# Patient Record
Sex: MALE | Race: WHITE | NOT HISPANIC OR LATINO | Employment: FULL TIME | URBAN - METROPOLITAN AREA
[De-identification: names, ages, dates, MRNs, and addresses within clinical notes are randomized per-mention and may not be internally consistent; named-entity substitution may affect disease eponyms.]

---

## 2017-10-26 ENCOUNTER — OFFICE VISIT (OUTPATIENT)
Dept: URGENT CARE | Facility: CLINIC | Age: 22
End: 2017-10-26
Payer: COMMERCIAL

## 2017-10-26 VITALS
HEIGHT: 65 IN | DIASTOLIC BLOOD PRESSURE: 84 MMHG | OXYGEN SATURATION: 97 % | TEMPERATURE: 98.6 F | HEART RATE: 78 BPM | RESPIRATION RATE: 16 BRPM | WEIGHT: 135 LBS | BODY MASS INDEX: 22.49 KG/M2 | SYSTOLIC BLOOD PRESSURE: 120 MMHG

## 2017-10-26 DIAGNOSIS — M94.0 COSTOCHONDRITIS: ICD-10-CM

## 2017-10-26 DIAGNOSIS — Z23 NEEDS FLU SHOT: ICD-10-CM

## 2017-10-26 PROCEDURE — 90471 IMMUNIZATION ADMIN: CPT | Performed by: FAMILY MEDICINE

## 2017-10-26 PROCEDURE — 90686 IIV4 VACC NO PRSV 0.5 ML IM: CPT | Performed by: FAMILY MEDICINE

## 2017-10-26 PROCEDURE — 99203 OFFICE O/P NEW LOW 30 MIN: CPT | Mod: 25 | Performed by: FAMILY MEDICINE

## 2017-10-27 ASSESSMENT — ENCOUNTER SYMPTOMS
EXERTIONAL CHEST PRESSURE: 0
VOMITING: 0
NEAR-SYNCOPE: 0
NAUSEA: 0
DIZZINESS: 0

## 2017-10-27 NOTE — PROGRESS NOTES
"Subjective:     Ronald Tang a 22 y.o. male who presents for Chest Pain (x 5-6 days, dull mid chest pain, pain is off / on)       Chest Pain    This is a new problem. The current episode started in the past 7 days. The onset quality is undetermined. The problem occurs intermittently. The problem has been waxing and waning. Pertinent negatives include no dizziness, exertional chest pressure, nausea, near-syncope or vomiting.     Review of Systems   Cardiovascular: Positive for chest pain. Negative for near-syncope.   Gastrointestinal: Negative for nausea and vomiting.   Neurological: Negative for dizziness.     No Known Allergies   Objective:   /84   Pulse 78   Temp 37 °C (98.6 °F)   Resp 16   Ht 1.651 m (5' 5\")   Wt 61.2 kg (135 lb)   SpO2 97%   BMI 22.47 kg/m²   Physical Exam   Constitutional: He is oriented to person, place, and time. He appears well-developed and well-nourished. No distress.   HENT:   Head: Normocephalic and atraumatic.   Eyes: Conjunctivae and EOM are normal. Pupils are equal, round, and reactive to light.   Cardiovascular: Normal rate and regular rhythm.    No murmur heard.  Pulmonary/Chest: Effort normal and breath sounds normal. No respiratory distress. He exhibits tenderness.   Abdominal: Soft. He exhibits no distension. There is no tenderness.   Neurological: He is alert and oriented to person, place, and time. He has normal reflexes. No sensory deficit.   Skin: Skin is warm and dry.   Psychiatric: He has a normal mood and affect.        Assessment/Plan:   Assessment    1. Costochondritis  Use over-the-counter pain reliever, such as acetaminophen (Tylenol), ibuprofen (Advil, Motrin) or naproxen (Aleve) as needed; follow package directions for dosing.     2. Needs flu shot  - Flu Quad Inj >3 Year Pre-Filled PF       "

## 2017-11-02 ENCOUNTER — OFFICE VISIT (OUTPATIENT)
Dept: MEDICAL GROUP | Facility: PHYSICIAN GROUP | Age: 22
End: 2017-11-02
Payer: COMMERCIAL

## 2017-11-02 ENCOUNTER — HOSPITAL ENCOUNTER (OUTPATIENT)
Facility: MEDICAL CENTER | Age: 22
End: 2017-11-02
Attending: NURSE PRACTITIONER
Payer: COMMERCIAL

## 2017-11-02 VITALS
DIASTOLIC BLOOD PRESSURE: 70 MMHG | BODY MASS INDEX: 22.33 KG/M2 | OXYGEN SATURATION: 96 % | HEIGHT: 65 IN | SYSTOLIC BLOOD PRESSURE: 112 MMHG | RESPIRATION RATE: 16 BRPM | HEART RATE: 64 BPM | WEIGHT: 134 LBS | TEMPERATURE: 98.5 F

## 2017-11-02 DIAGNOSIS — L74.519 EXCESSIVE SWEATING, LOCAL: ICD-10-CM

## 2017-11-02 DIAGNOSIS — F41.9 ANXIETY: ICD-10-CM

## 2017-11-02 DIAGNOSIS — M94.0 COSTOCHONDRITIS: ICD-10-CM

## 2017-11-02 DIAGNOSIS — Z11.3 SCREENING FOR STD (SEXUALLY TRANSMITTED DISEASE): ICD-10-CM

## 2017-11-02 PROBLEM — R07.9 CHEST PAIN: Status: ACTIVE | Noted: 2017-11-02

## 2017-11-02 PROCEDURE — 87491 CHLMYD TRACH DNA AMP PROBE: CPT

## 2017-11-02 PROCEDURE — 87591 N.GONORRHOEAE DNA AMP PROB: CPT

## 2017-11-02 PROCEDURE — 99000 SPECIMEN HANDLING OFFICE-LAB: CPT | Performed by: NURSE PRACTITIONER

## 2017-11-02 PROCEDURE — 99214 OFFICE O/P EST MOD 30 MIN: CPT | Performed by: NURSE PRACTITIONER

## 2017-11-02 ASSESSMENT — PAIN SCALES - GENERAL: PAINLEVEL: NO PAIN

## 2017-11-02 ASSESSMENT — PATIENT HEALTH QUESTIONNAIRE - PHQ9: CLINICAL INTERPRETATION OF PHQ2 SCORE: 0

## 2017-11-02 NOTE — ASSESSMENT & PLAN NOTE
This is a new problem. Patient states that since he switched to a different kind of marijuana he has been having increasing anxiety, paranoia states that he notices this most commonly right after he smokes. Patient states that he does notice some increased anxiety during work, repetitive thoughts, states that he has not been having any difficulty with sleep. Patient is not interested in medication physician at this time discussed with patient that I would recommend stopping marijuana and see if that improves his symptoms.

## 2017-11-02 NOTE — PROGRESS NOTES
"Chief Complaint   Patient presents with   • Establish Care     New Patient        HISTORY OF THE PRESENT ILLNESS: This is a 22 y.o. male new patient to our practice. This pleasant patient is here todayTo discuss chest pain, sweating, establish care.    Costochondritis  This is a new problem. States he has been doing a lot of jumping rope lately. States That the pain is intermittent and aching pain. Denies pressure, sweating, jaw pain, numbness in his arm. States he is also noticing some pain on the scars on his abdomen. Patient does report tenderness in his chest wall.    Excessive sweating, local  Chronic in nature. Stable. Patient states over the last year he has noticed excessive sweating on his bilateral palms. Patient states that this is \"sometimes\" related to anxiety but states that he does notice that intermittently at random times. States that he has had increased stress at work but is requesting referral to dermatology.    Anxiety  This is a new problem. Patient states that since he switched to a different kind of marijuana he has been having increasing anxiety, paranoia states that he notices this most commonly right after he smokes. Patient states that he does notice some increased anxiety during work, repetitive thoughts, states that he has not been having any difficulty with sleep. Patient is not interested in medication physician at this time discussed with patient that I would recommend stopping marijuana and see if that improves his symptoms.      Past Medical History:   Diagnosis Date   • Asthma        Past Surgical History:   Procedure Laterality Date   • APPENDECTOMY         Family Status   Relation Status   • Mother Alive   • Father Alive   • Maternal Grandmother Alive   • Maternal Grandfather Alive   • Paternal Grandmother Alive   • Paternal Grandfather      Family History   Problem Relation Age of Onset   • Cancer Mother      breast   • Hyperlipidemia Father    • Hypertension Father    • No " "Known Problems Maternal Grandmother    • Dementia Maternal Grandfather    • Heart Disease Paternal Grandmother      triple bypass   • Stroke Paternal Grandfather        Social History   Substance Use Topics   • Smoking status: Never Smoker   • Smokeless tobacco: Never Used   • Alcohol use 3.6 oz/week     6 Cans of beer per week       Allergies: Review of patient's allergies indicates no known allergies.    No current [facility]-ordered outpatient prescriptions on file.     No current [facility]-ordered facility-administered medications on file.        Review of Systems   Constitutional:  Negative for fever, chills, weight loss and malaise/fatigue.   HENT:  Negative for ear pain, nosebleeds, congestion, sore throat and neck pain.    Eyes:  Negative for blurred vision.   Respiratory:  Negative for cough, sputum production, shortness of breath and wheezing.    Cardiovascular:  Negative for chest pain, palpitations, orthopnea and leg swelling.   Gastrointestinal:  Negative for heartburn, nausea, vomiting and abdominal pain.   Genitourinary:  Negative for dysuria, urgency and frequency.   Musculoskeletal:  Negative for myalgias, back pain and joint pain.   Skin:  Negative for rash and itching.   Neurological:  Negative for dizziness, tingling, tremors, sensory change, focal weakness and headaches.   Endo/Heme/Allergies:  Does not bruise/bleed easily.   Psychiatric/Behavioral:  Negative for depression, anxiety, or memory loss.     All other systems reviewed and are negative except as in HPI.    Exam: Blood pressure 112/70, pulse 64, temperature 36.9 °C (98.5 °F), resp. rate 16, height 1.651 m (5' 5\"), weight 60.8 kg (134 lb), SpO2 96 %.  General:  Normal appearing. No distress.  HEENT:  Normocephalic. Eyes conjunctiva clear lids without ptosis, pupils equal and reactive to light accommodation, ears normal shape and contour, canals are clear bilaterally, tympanic membranes are benign, nasal mucosa benign, oropharynx is without " erythema, edema or exudates.   Neck:  Supple without JVD or bruit. Thyroid is not enlarged.  Pulmonary:  Clear to ausculation.  Normal effort. No rales, ronchi, or wheezing.  Cardiovascular:  Regular rate and rhythm without murmur. Carotid and radial pulses are intact and equal bilaterally.  Abdomen:  Soft, nontender, nondistended. Normal bowel sounds. Liver and spleen are not palpable  Neurologic:  Grossly nonfocal  Lymph:  No cervical, supraclavicular or axillary lymph nodes are palpable  Skin:  Warm and dry.  No obvious lesions.  Musculoskeletal:  Normal gait. No extremity cyanosis, clubbing, or edema.  Psych:  Normal mood and affect. Alert and oriented x3. Judgment and insight is normal.    PLAN:    1. Screening for STD (sexually transmitted disease)  - CHLAMYDIA/GC, DNA PROBE W/RFLX    2. Costochondritis  Discussed with patient that this is likely related to costochondritis, discussed that if symptoms do not improve we will consider an EKG. Discussed with patient's severe symptoms that would require a ER follow-up.    3. Excessive sweating, local  - REFERRAL TO DERMATOLOGY    Follow-up in one year or sooner as needed. Follow-up for worsening anxiety. Patient is encouraged to be seen in the emergency room for chest pain, palpitations, shortness of breath, dizziness, severe abdominal pain or other concerning symptoms.    Please note that this dictation was created using voice recognition software. I have made every reasonable attempt to correct obvious errors, but I expect that there are errors of grammar and possibly content that I did not discover before finalizing the note.      Assessment/Plan  1. Screening for STD (sexually transmitted disease)  CHLAMYDIA/GC, DNA PROBE W/RFLX   2. Costochondritis     3. Excessive sweating, local  REFERRAL TO DERMATOLOGY   4. Anxiety           I have placed the below orders and discussed them with an approved delegating provider. The MA is performing the below orders under  the direction of Dr. Soler.

## 2017-11-02 NOTE — ASSESSMENT & PLAN NOTE
This is a new problem. States he has been doing a lot of jumping rope lately. States That the pain is intermittent and aching pain. Denies pressure, sweating, jaw pain, numbness in his arm. States he is also noticing some pain on the scars on his abdomen. Patient does report tenderness in his chest wall.

## 2017-11-02 NOTE — ASSESSMENT & PLAN NOTE
"Chronic in nature. Stable. Patient states over the last year he has noticed excessive sweating on his bilateral palms. Patient states that this is \"sometimes\" related to anxiety but states that he does notice that intermittently at random times. States that he has had increased stress at work but is requesting referral to dermatology.  "

## 2017-11-03 LAB
C TRACH DNA SPEC QL NAA+PROBE: NEGATIVE
N GONORRHOEA DNA SPEC QL NAA+PROBE: NEGATIVE
SPECIMEN SOURCE: NORMAL

## 2017-11-06 ENCOUNTER — TELEPHONE (OUTPATIENT)
Dept: MEDICAL GROUP | Facility: PHYSICIAN GROUP | Age: 22
End: 2017-11-06

## 2017-11-06 NOTE — TELEPHONE ENCOUNTER
----- Message from EDWIN Quinteros sent at 11/6/2017  8:56 AM PST -----  Please call pt and give lab results: negative for chlamydia and gonorrhea.

## 2018-04-06 ENCOUNTER — OFFICE VISIT (OUTPATIENT)
Dept: MEDICAL GROUP | Facility: PHYSICIAN GROUP | Age: 23
End: 2018-04-06
Payer: COMMERCIAL

## 2018-04-06 VITALS
HEIGHT: 65 IN | SYSTOLIC BLOOD PRESSURE: 128 MMHG | TEMPERATURE: 98.4 F | HEART RATE: 64 BPM | WEIGHT: 139 LBS | BODY MASS INDEX: 23.16 KG/M2 | OXYGEN SATURATION: 98 % | DIASTOLIC BLOOD PRESSURE: 84 MMHG | RESPIRATION RATE: 14 BRPM

## 2018-04-06 DIAGNOSIS — M94.0 COSTOCHONDRITIS: ICD-10-CM

## 2018-04-06 DIAGNOSIS — R07.9 CHEST PAIN, UNSPECIFIED TYPE: ICD-10-CM

## 2018-04-06 DIAGNOSIS — H61.23 BILATERAL HEARING LOSS DUE TO CERUMEN IMPACTION: ICD-10-CM

## 2018-04-06 DIAGNOSIS — F41.9 ANXIETY: ICD-10-CM

## 2018-04-06 DIAGNOSIS — H81.10 BPPV (BENIGN PAROXYSMAL POSITIONAL VERTIGO), UNSPECIFIED LATERALITY: ICD-10-CM

## 2018-04-06 PROBLEM — L74.519 EXCESSIVE SWEATING, LOCAL: Status: RESOLVED | Noted: 2017-11-02 | Resolved: 2018-04-06

## 2018-04-06 PROCEDURE — 99214 OFFICE O/P EST MOD 30 MIN: CPT | Mod: 25 | Performed by: NURSE PRACTITIONER

## 2018-04-06 PROCEDURE — 69210 REMOVE IMPACTED EAR WAX UNI: CPT | Performed by: NURSE PRACTITIONER

## 2018-04-06 PROCEDURE — 93000 ELECTROCARDIOGRAM COMPLETE: CPT | Mod: 59 | Performed by: NURSE PRACTITIONER

## 2018-04-06 ASSESSMENT — PAIN SCALES - GENERAL: PAINLEVEL: NO PAIN

## 2018-04-06 NOTE — PROGRESS NOTES
"Chief Complaint   Patient presents with   • Annual Exam       HISTORY OF PRESENT ILLNESS: Patient is a 23 y.o. male established patient who presents today to follow-up on chest pain and discuss BPPV.    Costochondritis  States it happens during the day. Pain is happening less. States its aching \"pulsing\" pain.     BPPV (benign paroxysmal positional vertigo), unspecified laterality  This is a new problem. Started about a month ago. States that when he lies down he will have 30 seconds of spinning. States that he has noticed anything that makes it better or worse. States that he has not had this issue in the past but states that he usually gets his ears cleaned more frequently.    Anxiety  Chronic in nature. Improved. Patient states that he has been doing well with anxiety since he stopped smoking marijuana.    Chest pain  Chronic in nature. Patient states it has gotten better, but states that he is still noticing an intermittent pulsating pain in the center of his chest near her sternum. Patient states that this does not correlate with exercise, with eating or not eating states is not related to alcohol marijuana or caffeine consumption. States that nothing makes the pain better worse, states that it generally resolves quickly. Patient is amenable to EKG at this time.       Patient Active Problem List    Diagnosis Date Noted   • BPPV (benign paroxysmal positional vertigo), unspecified laterality 04/06/2018   • Chest pain 04/06/2018   • Bilateral hearing loss due to cerumen impaction 04/06/2018   • Anxiety 11/02/2017       Allergies:Patient has no known allergies.    No current outpatient prescriptions on file.     No current facility-administered medications for this visit.        Social History   Substance Use Topics   • Smoking status: Never Smoker   • Smokeless tobacco: Never Used   • Alcohol use 3.6 oz/week     6 Cans of beer per week       Family Status   Relation Status   • Mother Alive   • Father Alive   • " "Maternal Grandmother Alive   • Maternal Grandfather Alive   • Paternal Grandmother Alive   • Paternal Grandfather      Family History   Problem Relation Age of Onset   • Cancer Mother      breast   • Hyperlipidemia Father    • Hypertension Father    • No Known Problems Maternal Grandmother    • Dementia Maternal Grandfather    • Heart Disease Paternal Grandmother      triple bypass   • Stroke Paternal Grandfather        Review of Systems:   Constitutional:  Negative for fever, chills, weight loss and malaise/fatigue.   HEENT:  Negative for ear pain, nosebleeds, congestion, sore throat and neck pain.    Eyes:  Negative for blurred vision.   Respiratory:  Negative for cough, sputum production, shortness of breath and wheezing.    Cardiovascular:  Negative for chest pain, palpitations, orthopnea and leg swelling.   Gastrointestinal:  Negative for heartburn, nausea, vomiting and abdominal pain.   Genitourinary:  Negative for dysuria, urgency and frequency.   Musculoskeletal:  Negative for myalgias, back pain and joint pain.   Skin:  Negative for rash and itching.   Neurological:  Negative for dizziness, tingling, tremors, sensory change, focal weakness and headaches.   Endo/Heme/Allergies:  Does not bruise/bleed easily.   Psychiatric/Behavioral:  Negative for depression, suicidal ideas and memory loss.  The patient is not nervous/anxious and does not have insomnia.    All other systems reviewed and are negative except as in HPI.    Exam:  Blood pressure 128/84, pulse 64, temperature 36.9 °C (98.4 °F), resp. rate 14, height 1.651 m (5' 5\"), weight 63 kg (139 lb), SpO2 98 %.  General:  Normal appearing. No distress.  HEENT:  Normocephalic. Eyes conjunctiva clear lids without ptosis, pupils equal and reactive to light accommodation, ears normal shape and contour, canals are clear bilaterally, tympanic membranes are benign, nasal mucosa benign, oropharynx is without erythema, edema or exudates.   Neck:  Supple without JVD " or bruit. Thyroid is not enlarged.  Pulmonary:  Clear to ausculation.  Normal effort. No rales, ronchi, or wheezing.  Cardiovascular:  Regular rate and rhythm without murmur. Carotid and radial pulses are intact and equal bilaterally.  Abdomen:  Soft, nontender, nondistended. Normal bowel sounds. Liver and spleen are not palpable  Neurologic:  Grossly nonfocal  Lymph:  No cervical, supraclavicular or axillary lymph nodes are palpable  Skin:  Warm and dry.  No obvious lesions.  Musculoskeletal:  Normal gait. No extremity cyanosis, clubbing, or edema.  Psych:  Normal mood and affect. Alert and oriented x3. Judgment and insight is normal.    EKG Interpretation   Interpreted by me   Rhythm: normal sinus   Rate: normal   Conduction: normal   ST Segments: no acute change   T Waves: no acute change   Q Waves: none   Clinical Impression: no acute changes and normal EKG    Procedure: Cerumen Removal  Risks and benefits of procedure discussed with patient.  Cerumen removed with lavage,  was instilled  Patient tolerated the procedure well  Pt educated about proper care of ear canal. Q-tip cleaning discouraged, use of debrox and warm water lavage discussed.  Post lavage curette performed by provider, Post procedure exam with clear canal and normal TM.      PLAN:    1. Costochondritis  Tenderness to muscles is improved.     2. BPPV (benign paroxysmal positional vertigo), unspecified laterality  Cerumen removed, patient will let me know if symptoms worsen or continue.    3. Chest pain, unspecified type  EKG is within normal limits plan to complete echocardiogram for reassurance related to pulsating pain patient sternum.  - ECHOCARDIOGRAM COMP W/O CONT; Future  - EKG    4. Anxiety  Improved.    5. Bilateral hearing loss due to cerumen impaction  Improved after cerumen removal.    Follow-up in three months or sooner as needed. Patient is encouraged to be seen in the emergency room for chest pain, palpitations, shortness of breath,  dizziness, severe abdominal pain or other concerning symptoms.    Please note that this dictation was created using voice recognition software. I have made every reasonable attempt to correct obvious errors, but I expect that there are errors of grammar and possibly content that I did not discover before finalizing the note.    Assessment/Plan:  1. Costochondritis     2. BPPV (benign paroxysmal positional vertigo), unspecified laterality     3. Chest pain, unspecified type  ECHOCARDIOGRAM COMP W/O CONT    EKG   4. Anxiety     5. Bilateral hearing loss due to cerumen impaction            I have placed the below orders and discussed them with an approved delegating provider. The MA is performing the below orders under the direction of Dr. Soler.

## 2018-04-06 NOTE — ASSESSMENT & PLAN NOTE
This is a new problem. Started about a month ago. States that when he lies down he will have 30 seconds of spinning. States that he has noticed anything that makes it better or worse. States that he has not had this issue in the past but states that he usually gets his ears cleaned more frequently.

## 2018-04-06 NOTE — ASSESSMENT & PLAN NOTE
Chronic in nature. Improved. Patient states that he has been doing well with anxiety since he stopped smoking marijuana.

## 2018-04-06 NOTE — ASSESSMENT & PLAN NOTE
Chronic in nature. Patient states it has gotten better, but states that he is still noticing an intermittent pulsating pain in the center of his chest near her sternum. Patient states that this does not correlate with exercise, with eating or not eating states is not related to alcohol marijuana or caffeine consumption. States that nothing makes the pain better worse, states that it generally resolves quickly. Patient is amenable to EKG at this time.

## 2018-04-23 ENCOUNTER — APPOINTMENT (OUTPATIENT)
Dept: CARDIOLOGY | Facility: MEDICAL CENTER | Age: 23
End: 2018-04-23
Attending: NURSE PRACTITIONER
Payer: COMMERCIAL

## 2018-04-23 ENCOUNTER — PATIENT MESSAGE (OUTPATIENT)
Dept: MEDICAL GROUP | Facility: PHYSICIAN GROUP | Age: 23
End: 2018-04-23